# Patient Record
Sex: MALE | Race: WHITE | URBAN - METROPOLITAN AREA
[De-identification: names, ages, dates, MRNs, and addresses within clinical notes are randomized per-mention and may not be internally consistent; named-entity substitution may affect disease eponyms.]

---

## 2017-08-28 ENCOUNTER — HISTORICAL (OUTPATIENT)
Dept: LAB | Facility: HOSPITAL | Age: 82
End: 2017-08-28

## 2017-08-28 LAB
ABS NEUT (OLG): 3.26 X10(3)/MCL (ref 2.1–9.2)
ALBUMIN SERPL-MCNC: 3.9 GM/DL (ref 3.4–5)
ALBUMIN/GLOB SERPL: 1.2 {RATIO}
ALP SERPL-CCNC: 82 UNIT/L (ref 50–136)
ALT SERPL-CCNC: 19 UNIT/L (ref 12–78)
AST SERPL-CCNC: 14 UNIT/L (ref 15–37)
BASOPHILS # BLD AUTO: 0 X10(3)/MCL (ref 0–0.2)
BASOPHILS NFR BLD AUTO: 0 %
BILIRUB SERPL-MCNC: 0.7 MG/DL (ref 0.2–1)
BILIRUBIN DIRECT+TOT PNL SERPL-MCNC: 0.2 MG/DL (ref 0–0.2)
BILIRUBIN DIRECT+TOT PNL SERPL-MCNC: 0.5 MG/DL (ref 0–0.8)
BUN SERPL-MCNC: 16 MG/DL (ref 7–18)
CALCIUM SERPL-MCNC: 8.8 MG/DL (ref 8.5–10.1)
CHLORIDE SERPL-SCNC: 104 MMOL/L (ref 98–107)
CHOLEST SERPL-MCNC: 189 MG/DL (ref 0–200)
CHOLEST/HDLC SERPL: 3.9 {RATIO} (ref 0–5)
CO2 SERPL-SCNC: 31 MMOL/L (ref 21–32)
CREAT SERPL-MCNC: 1.09 MG/DL (ref 0.7–1.3)
EOSINOPHIL # BLD AUTO: 0.2 X10(3)/MCL (ref 0–0.9)
EOSINOPHIL NFR BLD AUTO: 4 %
ERYTHROCYTE [DISTWIDTH] IN BLOOD BY AUTOMATED COUNT: 13 % (ref 11.5–17)
GLOBULIN SER-MCNC: 3.2 GM/DL (ref 2.4–3.5)
GLUCOSE SERPL-MCNC: 80 MG/DL (ref 74–106)
HCT VFR BLD AUTO: 46.2 % (ref 42–52)
HDLC SERPL-MCNC: 49 MG/DL (ref 35–60)
HGB BLD-MCNC: 15.3 GM/DL (ref 14–18)
LDLC SERPL CALC-MCNC: 118 MG/DL (ref 0–129)
LYMPHOCYTES # BLD AUTO: 1.8 X10(3)/MCL (ref 0.6–4.6)
LYMPHOCYTES NFR BLD AUTO: 30 %
MCH RBC QN AUTO: 29.3 PG (ref 27–31)
MCHC RBC AUTO-ENTMCNC: 33.1 GM/DL (ref 33–36)
MCV RBC AUTO: 88.3 FL (ref 80–94)
MONOCYTES # BLD AUTO: 0.6 X10(3)/MCL (ref 0.1–1.3)
MONOCYTES NFR BLD AUTO: 10 %
NEUTROPHILS # BLD AUTO: 3.26 X10(3)/MCL (ref 2.1–9.2)
NEUTROPHILS NFR BLD AUTO: 55 %
PLATELET # BLD AUTO: 185 X10(3)/MCL (ref 130–400)
PMV BLD AUTO: 10.8 FL (ref 9.4–12.4)
POTASSIUM SERPL-SCNC: 3.7 MMOL/L (ref 3.5–5.1)
PROT SERPL-MCNC: 7.1 GM/DL (ref 6.4–8.2)
RBC # BLD AUTO: 5.23 X10(6)/MCL (ref 4.7–6.1)
SODIUM SERPL-SCNC: 142 MMOL/L (ref 136–145)
TRIGL SERPL-MCNC: 109 MG/DL (ref 30–150)
TSH SERPL-ACNC: 3.94 MIU/ML (ref 0.36–3.74)
VLDLC SERPL CALC-MCNC: 22 MG/DL
WBC # SPEC AUTO: 5.9 X10(3)/MCL (ref 4.5–11.5)

## 2018-02-27 ENCOUNTER — HISTORICAL (OUTPATIENT)
Dept: LAB | Facility: HOSPITAL | Age: 83
End: 2018-02-27

## 2018-02-27 LAB
APTT PPP: 23.2 SECOND(S) (ref 21–30)
INR PPP: 1.1 (ref 2–3)
PROTHROMBIN TIME: 10.8 SECOND(S) (ref 9.3–11.4)

## 2018-08-07 ENCOUNTER — HISTORICAL (OUTPATIENT)
Dept: ADMINISTRATIVE | Facility: HOSPITAL | Age: 83
End: 2018-08-07

## 2018-08-21 ENCOUNTER — HISTORICAL (OUTPATIENT)
Dept: LAB | Facility: HOSPITAL | Age: 83
End: 2018-08-21

## 2018-08-21 LAB
T3FREE SERPL-MCNC: 2.53 PG/ML (ref 2.18–3.98)
T4 FREE SERPL-MCNC: 1.29 NG/DL (ref 0.76–1.46)
TSH SERPL-ACNC: 1.51 MIU/L (ref 0.36–3.74)

## 2018-09-05 ENCOUNTER — HISTORICAL (OUTPATIENT)
Dept: ADMINISTRATIVE | Facility: HOSPITAL | Age: 83
End: 2018-09-05

## 2018-09-25 ENCOUNTER — HISTORICAL (OUTPATIENT)
Dept: LAB | Facility: HOSPITAL | Age: 83
End: 2018-09-25

## 2018-09-25 LAB
BUN SERPL-MCNC: 18 MG/DL (ref 7–18)
CALCIUM SERPL-MCNC: 9.3 MG/DL (ref 8.5–10.1)
CHLORIDE SERPL-SCNC: 103 MMOL/L (ref 98–107)
CHOLEST SERPL-MCNC: 160 MG/DL (ref 0–200)
CHOLEST/HDLC SERPL: 3.3 {RATIO} (ref 0–5)
CO2 SERPL-SCNC: 31 MMOL/L (ref 21–32)
CREAT SERPL-MCNC: 1.22 MG/DL (ref 0.7–1.3)
CREAT/UREA NIT SERPL: 14.8
GLUCOSE SERPL-MCNC: 84 MG/DL (ref 74–106)
HDLC SERPL-MCNC: 49 MG/DL (ref 35–60)
LDLC SERPL CALC-MCNC: 87 MG/DL (ref 0–129)
POTASSIUM SERPL-SCNC: 3.8 MMOL/L (ref 3.5–5.1)
SODIUM SERPL-SCNC: 143 MMOL/L (ref 136–145)
TRIGL SERPL-MCNC: 122 MG/DL (ref 30–150)
TSH SERPL-ACNC: 1.53 MIU/L (ref 0.36–3.74)
VLDLC SERPL CALC-MCNC: 24 MG/DL

## 2018-10-17 ENCOUNTER — HISTORICAL (OUTPATIENT)
Dept: ADMINISTRATIVE | Facility: HOSPITAL | Age: 83
End: 2018-10-17

## 2019-03-26 ENCOUNTER — HISTORICAL (OUTPATIENT)
Dept: LAB | Facility: HOSPITAL | Age: 84
End: 2019-03-26

## 2019-03-26 LAB
CHOLEST SERPL-MCNC: 163 MG/DL (ref 0–200)
CHOLEST/HDLC SERPL: 3.3 {RATIO} (ref 0–5)
HDLC SERPL-MCNC: 50 MG/DL (ref 35–60)
LDLC SERPL CALC-MCNC: 94 MG/DL (ref 0–129)
TRIGL SERPL-MCNC: 96 MG/DL (ref 30–150)
TSH SERPL-ACNC: 1.16 MIU/L (ref 0.36–3.74)
VLDLC SERPL CALC-MCNC: 19 MG/DL

## 2019-04-30 ENCOUNTER — HISTORICAL (OUTPATIENT)
Dept: ADMINISTRATIVE | Facility: HOSPITAL | Age: 84
End: 2019-04-30

## 2019-09-26 ENCOUNTER — HISTORICAL (OUTPATIENT)
Dept: LAB | Facility: HOSPITAL | Age: 84
End: 2019-09-26

## 2019-09-26 LAB
ABS NEUT (OLG): 3.82 X10(3)/MCL (ref 2.1–9.2)
ALBUMIN SERPL-MCNC: 3.4 GM/DL (ref 3.4–5)
ALBUMIN/GLOB SERPL: 0.8 RATIO (ref 1–2)
ALP SERPL-CCNC: 96 UNIT/L (ref 45–117)
ALT SERPL-CCNC: 14 UNIT/L (ref 16–61)
APPEARANCE, UA: CLEAR
AST SERPL-CCNC: 15 UNIT/L (ref 15–37)
BASOPHILS # BLD AUTO: 0.03 X10(3)/MCL (ref 0–0.2)
BASOPHILS NFR BLD AUTO: 0.5 % (ref 0–0.9)
BILIRUB SERPL-MCNC: 0.6 MG/DL (ref 0.2–1)
BILIRUB UR QL STRIP: NEGATIVE
BILIRUBIN DIRECT+TOT PNL SERPL-MCNC: 0.14 MG/DL (ref 0–0.2)
BILIRUBIN DIRECT+TOT PNL SERPL-MCNC: 0.46 MG/DL (ref 0–1)
BUN SERPL-MCNC: 14 MG/DL (ref 7–18)
CALCIUM SERPL-MCNC: 8.7 MG/DL (ref 8.5–10.1)
CHLORIDE SERPL-SCNC: 105 MMOL/L (ref 98–107)
CHOLEST SERPL-MCNC: 158 MG/DL (ref 0–199)
CHOLEST/HDLC SERPL: 3 MG/DL (ref 0–8)
CO2 SERPL-SCNC: 32 MMOL/L (ref 21–32)
COLOR UR: YELLOW
CREAT SERPL-MCNC: 1.14 MG/DL (ref 0.7–1.3)
EOSINOPHIL # BLD AUTO: 0.18 X10(3)/MCL (ref 0–0.9)
EOSINOPHIL NFR BLD AUTO: 2.7 % (ref 0–6.5)
ERYTHROCYTE [DISTWIDTH] IN BLOOD BY AUTOMATED COUNT: 13 % (ref 11.5–17)
GLOBULIN SER-MCNC: 4 GM/DL (ref 2–4)
GLUCOSE (UA): NEGATIVE
GLUCOSE SERPL-MCNC: 98 MG/DL (ref 74–106)
HCT VFR BLD AUTO: 44.3 % (ref 42–52)
HDLC SERPL-MCNC: 49 MG/DL
HGB BLD-MCNC: 15.2 GM/DL (ref 14–18)
HGB UR QL STRIP: NEGATIVE
IMM GRANULOCYTES # BLD AUTO: 0.03 10*3/UL (ref 0–0.02)
IMM GRANULOCYTES NFR BLD AUTO: 0.5 % (ref 0–0.43)
KETONES UR QL STRIP: NEGATIVE
LDLC SERPL CALC-MCNC: 94 MG/DL (ref 0–129)
LEUKOCYTE ESTERASE UR QL STRIP: NEGATIVE
LYMPHOCYTES # BLD AUTO: 1.97 X10(3)/MCL (ref 0.6–4.6)
LYMPHOCYTES NFR BLD AUTO: 29.8 % (ref 16.2–38.3)
MCH RBC QN AUTO: 30.6 PG (ref 27–31)
MCHC RBC AUTO-ENTMCNC: 34.3 GM/DL (ref 33–36)
MCV RBC AUTO: 89.1 FL (ref 80–94)
MONOCYTES # BLD AUTO: 0.58 X10(3)/MCL (ref 0.1–1.3)
MONOCYTES NFR BLD AUTO: 8.8 % (ref 4.7–11.3)
NEUTROPHILS # BLD AUTO: 3.82 X10(3)/MCL (ref 2.1–9.2)
NEUTROPHILS NFR BLD AUTO: 57.7 % (ref 49.1–73.4)
NITRITE UR QL STRIP: NEGATIVE
NRBC BLD AUTO-RTO: 0 % (ref 0–0.2)
PH UR STRIP: 6 [PH] (ref 5–7)
PLATELET # BLD AUTO: 201 X10(3)/MCL (ref 130–400)
PMV BLD AUTO: 9.9 FL (ref 7.4–10.4)
POTASSIUM SERPL-SCNC: 3.3 MMOL/L (ref 3.5–5.1)
PROT SERPL-MCNC: 7 GM/DL (ref 6.4–8.2)
PROT UR QL STRIP: NEGATIVE
RBC # BLD AUTO: 4.97 X10(6)/MCL (ref 4.7–6.1)
SODIUM SERPL-SCNC: 142 MMOL/L (ref 136–145)
SP GR UR STRIP: 1.01 (ref 1–1.03)
T4 FREE SERPL-MCNC: 1.13 NG/DL (ref 0.76–1.46)
TRIGL SERPL-MCNC: 76 MG/DL (ref 0–149)
TSH SERPL-ACNC: 2.36 MIU/ML (ref 0.36–3.74)
UROBILINOGEN UR STRIP-ACNC: NEGATIVE
VLDLC SERPL CALC-MCNC: 15 MG/DL
WBC # SPEC AUTO: 6.6 X10(3)/MCL (ref 4.5–11.5)

## 2019-11-22 ENCOUNTER — HISTORICAL (OUTPATIENT)
Dept: ADMINISTRATIVE | Facility: HOSPITAL | Age: 84
End: 2019-11-22

## 2022-04-12 ENCOUNTER — HISTORICAL (OUTPATIENT)
Dept: ADMINISTRATIVE | Facility: HOSPITAL | Age: 87
End: 2022-04-12

## 2022-04-30 VITALS
BODY MASS INDEX: 31.75 KG/M2 | HEIGHT: 70 IN | OXYGEN SATURATION: 98 % | DIASTOLIC BLOOD PRESSURE: 74 MMHG | SYSTOLIC BLOOD PRESSURE: 137 MMHG | WEIGHT: 221.81 LBS

## 2022-05-05 NOTE — HISTORICAL [facility] CERNER
This is a historical note converted from Darlene. Formatting and pictures may have been removed.  Please reference Darlene for original formatting and attached multimedia. Chief Complaint  F/U right leg fx  History of Present Illness  This 86-year-old gentleman returns for his right fibula fracture.? He is developing pain along his syndesmosis.? He is 2 weeks out from injury.? He is accompanied by his wife.? He is ambulating in a cam walker with a single cane.  Review of Systems  Constitutional: No fever, weakness, or fatigue.  Ear/Nose/Mouth/Throat: No nasal congestion or sore throat.  Respiratory: No shortness of breath or cough.  Cardiovascular: No chest pain, palpitations, or peripheral edema.  Gastrointestinal: No nausea, vomiting, or abdominal pain.  Genitourinary: No dysuria.  Musculoskeletal: See current complaints  Integumentary: Negative.  Physical Exam  Vitals & Measurements  HR:?61(Peripheral)? BP:?153/84?  HT:?180?cm? HT:?180?cm? WT:?102.5?kg? WT:?102.5?kg? BMI:?31.64?  Physical examination shows that he is tender on his proximal fibula.? He is tender along his syndesmosis and has pain with compression of the syndesmosis.? He has resolving ecchymosis on the medial side of his ankle.? He appears to be motor and sensory intact.  ?  AP and lateral of the right tibia?to include the entire length of the fibula shows that the patients fracture is unchanged in position but little?callus is seen.? There is minimal widening of the syndesmosis.  Assessment/Plan  1.?Closed fracture of shaft of right fibula  ? The findings were reviewed with the patient and he expressed understanding.? The patient understands that he may?develop progressive syndesmosis instability.? He is comfortable with this and does not want to have surgery.? I will see him back in 4 weeks with new x-rays.? He is instructed to call if he has any problems prior to his next appointment.  Ordered:  Clinic Follow up, *Est. 09/04/18 3:00:00 CDT, Order  for future visit, Closed fracture of shaft of right fibula  Ankle syndesmosis disruption, [facility] [place]  Office/Outpatient Visit Level 3 Established [id number] PC, Closed fracture of shaft of right fibula  Ankle syndesmosis disruption, [facility] [place], 08/07/18 15:15:00 CDT  XR Tibia-Fibula Right 2 Views, Routine, *Est. 09/04/18 3:00:00 CDT, Fracture, None, Ambulatory, Rad Type, Order for future visit, Closed fracture of shaft of right fibula  Ankle syndesmosis disruption, Not Scheduled, *Est. 09/04/18 3:00:00 CDT  ?  2.?Ankle syndesmosis disruption  Ordered:  Clinic Follow up, *Est. 09/04/18 3:00:00 CDT, Order for future visit, Closed fracture of shaft of right fibula  Ankle syndesmosis disruption, [facility] [place]  Office/Outpatient Visit Level 3 Established [id number] PC, Closed fracture of shaft of right fibula  Ankle syndesmosis disruption, [facility] [place], 08/07/18 15:15:00 CDT  XR Tibia-Fibula Right 2 Views, Routine, *Est. 09/04/18 3:00:00 CDT, Fracture, None, Ambulatory, Rad Type, Order for future visit, Closed fracture of shaft of right fibula  Ankle syndesmosis disruption, Not Scheduled, *Est. 09/04/18 3:00:00 CDT  ?   Problem List/Past Medical History  Ongoing  Allergic rhinitis  Ankle syndesmosis disruption  BPH (benign prostatic hyperplasia)  Closed fracture of shaft of right fibula  Glaucoma  History of CVA (cerebrovascular accident)  History of nephrolithiasis  Hypertension  Hypothyroid  Neuropathy (nerve damage)  OA (ocular albinism)  WBC decreased  Historical  HTN (hypertension)  Kidney stones  Stroke  Procedure/Surgical History  Application of short leg splint (calf to foot) (07/20/2018)  Immobilization of Right Lower Leg using Splint (07/20/2018)  Endoscopy and biopsy of upper gastrointestinal tract (02/28/2018)  Repair Left Hand Skin, External Approach (03/14/2016)  Simple repair of superficial wounds of scalp, neck, axillae, external genitalia, trunk and/or extremities (including  hands and feet); 2.6 cm to 7.5 cm (03/14/2016)  Discission of secondary membrane [after cataract] (06/17/2013)  Discission of secondary membranous cataract (opacified posterior lens capsule and/or anterior hyaloid); laser surgery (eg, YAG laser) (1 or more stages). (06/17/2013)  Colonoscopy (2012)  Open leg wound  Pacemaker care assessment   Medications  amlodipine 10 mg oral tablet, 10 mg= 1 tab(s), Oral, Daily, 1 refills  aspirin 81 mg oral tablet, CHEWABLE, 81 mg= 1 tab(s), Oral, Daily  AZOPT AUBRYE 1% OP, 1 drop(s), OPTH, BID  clopidogrel 75 mg oral tablet, 75 mg= 1 tab(s), Oral, Daily, 1 refills  hydrochlorothiazide 25 mg oral tablet, 25 mg= 1 tab(s), Oral, Daily  lansoprazole 30 mg oral DR capsule (pt. 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 ??OverDue  ??? ? ? ?Coronary Artery Disease Maintenance-Antiplatelet Agent Prescribed due??and every?  ??? ??Due?  ??? ? ? ?Coronary Artery Disease Maintenance-Lipid Lowering Therapy due??08/07/18??and every?  ??? ? ?  ?Functional Assessment due??08/07/18??and every 1??year(s)  ??? ? ? ?Pneumococcal Vaccine due??08/07/18??and every 100??year(s)  ??? ? ? ?Pneumococcal Vaccine due??08/07/18??and every?  ??? ??Due In Future?  ??? ? ? ?Hypertension Management-BMP not due until??08/28/18??and every 1??year(s)  ??? ? ? ?Hypertension Management-Blood Pressure not due until??07/24/19??and every 1??year(s)  ???Satisfied?(in the past 1 year)  ??? ??Satisfied?  ??? ? ? ?Blood Pressure Screening on??08/07/18.??Satisfied by Mónica Villaseñor.  ??? ? ? ?Body Mass Index Check on??08/07/18.??Satisfied by Mónica Villaseñor  ??? ? ? ?Coronary Artery Disease Maintenance-Antiplatelet Agent Prescribed on??03/22/18.??Satisfied by Cookie Perez MD  ??? ? ? ?Depression Screening on??08/07/18.??Satisfied by Mónica Villaseñor  ??? ? ? ?Diabetes Screening on??08/28/17.??Satisfied by Cookie Perez MD  ??? ? ? ?Fall Risk Assessment on??08/07/18.??Satisfied by Mónica Villaseñor  ??? ? ? ?Hypertension Management-Blood Pressure on??08/07/18.??Satisfied by Mónica Villaseñor  ??? ? ? ?Influenza Vaccine on??10/23/17.??Satisfied by Zoey Carrizales LPN  ??? ? ? ?Lipid Screening on??08/28/17.??Satisfied by Cookie Perez MD  ??? ? ? ?Obesity Screening on??08/07/18.??Satisfied by Mónica Villaseñor  ?  ?

## 2022-05-05 NOTE — HISTORICAL [facility] CERNER
This is a historical note converted from Darlene. Formatting and pictures may have been removed.  Please reference Darlene for original formatting and attached multimedia. Chief Complaint  4 week F/U Fx right fibula, sydesmosis disruption-patient states he has no pain today-he is ambulating in his cam walker and using a cane  History of Present Illness  This 86-year-old gentleman returns for his right fibula fracture and syndesmosis disruption. ?He is accompanied by his wife.? He has been ambulating in his cam walker. ?He states that as of about a week ago he had no pain.  Review of Systems  Constitutional: No fever, weakness, or fatigue.  Ear/Nose/Mouth/Throat: No nasal congestion or sore throat.  Respiratory: No shortness of breath or cough.  Cardiovascular: No chest pain, palpitations, or peripheral edema.  Gastrointestinal: No nausea, vomiting, or abdominal pain.  Genitourinary: No dysuria.  Musculoskeletal: See current complaints  Integumentary: Negative.  Physical Exam  Vitals & Measurements  HR:?61(Peripheral)? BP:?153/84?  HT:?180?cm? HT:?180?cm? WT:?102.5?kg? WT:?102.5?kg? BMI:?31.64?  Physical examination shows that his alignment is maintained.? There is minimal tenderness at his fracture site.? There is minimal tenderness in his syndesmosis.? He appears to be motor and sensory intact.  ?  AP and lateral?of the right?proximal tibia and fibula shows that his fracture is healing.? There is some displacement but he has excellent bridging callus and should go on to?eventual healing.  Assessment/Plan  1.?Ankle syndesmosis disruption  ? The findings and x-rays were reviewed with the patient and his wife.? He would like to transition to regular shoes as he is comfortable.? I will see him back in 6 weeks with new x-rays. ?He understands that it will take a while for x-ray consolidation.? He is instructed to call if he has any problems prior to his next appointment.? He requested no pain medication.  Ordered:  Clinic  Follow up, *Est. 10/17/18 3:00:00 CDT, Order for future visit, Ankle syndesmosis disruption  Closed fracture of shaft of right fibula, [facility] [place]  Office/Outpatient Visit Level 3 Established [id number] PC, Ankle syndesmosis disruption  Closed fracture of shaft of right fibula, [facility] [place], 09/05/18 14:36:00 CDT  XR Tibia-Fibula Right 2 Views, Routine, *Est. 10/17/18 3:00:00 CDT, Fracture, None, Ambulatory, Rad Type, Order for future visit, Ankle syndesmosis disruption  Closed fracture of shaft of right fibula, Not Scheduled, *Est. 10/17/18 3:00:00 CDT  ?  2.?Closed fracture of shaft of right fibula  Ordered:  Clinic Follow up, *Est. 10/17/18 3:00:00 CDT, Order for future visit, Ankle syndesmosis disruption  Closed fracture of shaft of right fibula, [facility]  Office/Outpatient Visit Level 3 Established [id number] PC, Ankle syndesmosis disruption  Closed fracture of shaft of right fibula, [facility] [place], 09/05/18 14:36:00 CDT  XR Tibia-Fibula Right 2 Views, Routine, *Est. 10/17/18 3:00:00 CDT, Fracture, None, Ambulatory, Rad Type, Order for future visit, Ankle syndesmosis disruption  Closed fracture of shaft of right fibula, Not Scheduled, *Est. 10/17/18 3:00:00 CDT  ?   Problem List/Past Medical History  Ongoing  Allergic rhinitis  Ankle syndesmosis disruption  BPH (benign prostatic hyperplasia)  Closed fracture of shaft of right fibula  Glaucoma  History of CVA (cerebrovascular accident)  History of nephrolithiasis  Hypertension  Hypothyroid  Neuropathy (nerve damage)  OA (ocular albinism)  WBC decreased  Historical  HTN (hypertension)  Kidney stones  Stroke  Procedure/Surgical History  Application of short leg splint (calf to foot) (07/20/2018)  Immobilization of Right Lower Leg using Splint (07/20/2018)  Endoscopy and biopsy of upper gastrointestinal tract (02/28/2018)  Repair Left Hand Skin, External Approach (03/14/2016)  Simple repair of superficial wounds of scalp, neck, axillae,  external genitalia, trunk and/or extremities (including hands and feet); 2.6 cm to 7.5 cm (03/14/2016)  Discission of secondary membrane [after cataract] (06/17/2013)  Discission of secondary membranous cataract (opacified posterior lens capsule and/or anterior hyaloid); laser surgery (eg, YAG laser) (1 or more stages). (06/17/2013)  Colonoscopy (2012)  Open leg wound  Pacemaker care assessment   Medications  amlodipine 10 mg oral tablet, 10 mg= 1 tab(s), Oral, Daily, 1 refills  aspirin 81 mg oral tablet, CHEWABLE, 81 mg= 1 tab(s), Oral, Daily  AZOPT AUBREY 1% OP, 1 drop(s), OPTH, BID  clopidogrel 75 mg oral tablet, 75 mg= 1 tab(s), Oral, Daily, 1 refills  hydrochlorothiazide 25 mg oral tablet, 25 mg= 1 tab(s), Oral, Daily  lansoprazole 30 mg oral DR capsule (pt. 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 ??OverDue  ??? ? ? ?Coronary Artery Disease Maintenance-Antiplatelet Agent Prescribed  due??and every?  ??? ? ? ?Hypertension Management-BMP due??08/28/18??and every 1??year(s)  ??? ??Due?  ??? ? ? ?Coronary Artery Disease Maintenance-Lipid Lowering Therapy due??09/05/18??and every?  ??? ? ? ?Functional Assessment due??09/05/18??and every 1??year(s)  ??? ? ? ?Pneumococcal Vaccine due??09/05/18??and every 100??year(s)  ??? ? ? ?Pneumococcal Vaccine due??09/05/18??and every?  ???Satisfied?(in the past 1 year)  ??? ??Satisfied?  ??? ? ? ?Blood Pressure Screening on??09/05/18.??Satisfied by Mónica Villaseñor  ??? ? ? ?Body Mass Index Check on??09/05/18.??Satisfied by Mónica Villaseñor  ??? ? ? ?Coronary Artery Disease Maintenance-Antiplatelet Agent Prescribed on??03/22/18.??Satisfied by Cookie Perez MD  ??? ? ? ?Depression Screening on??09/05/18.??Satisfied by Mónica Villaseñor  ??? ? ? ?Fall Risk Assessment on??09/05/18.??Satisfied by Mónica Villaseñor  ??? ? ? ?Hypertension Management-Blood Pressure on??09/05/18.??Satisfied by Mónica Villaseñor  ??? ? ? ?Influenza Vaccine on??10/23/17.??Satisfied by Zoey Carrizales LPN  ??? ? ? ?Obesity Screening on??09/05/18.??Satisfied by Mónica Villaseñor  ?  ?

## 2022-05-05 NOTE — HISTORICAL [facility] CERNER
This is a historical note converted from Darlene. Formatting and pictures may have been removed.  Please reference Darlene for original formatting and attached multimedia. Chief Complaint  6 week F/U right leg fx-patient is doing well with no pain today  History of Present Illness  This 86-year-old returns for his?right?proximal?fibula fracture.? The patient is complaining of no pain. ?He is accompanied by his wife.? He states that he has occasional pain around his ankle but not at his fracture site.  Review of Systems  Constitutional: No fever, weakness, or fatigue.  Ear/Nose/Mouth/Throat: No nasal congestion or sore throat.  Respiratory: No shortness of breath or cough.  Cardiovascular: No chest pain, palpitations, or peripheral edema.  Gastrointestinal: No nausea, vomiting, or abdominal pain.  Genitourinary: No dysuria.  Musculoskeletal: See current complaints  Integumentary: Negative.  Physical Exam  Vitals & Measurements  HR:?60(Peripheral)? BP:?109/68?  HT:?180?cm? HT:?180?cm? WT:?100.55?kg? WT:?100.55?kg? BMI:?31.03?  Physical examination shows that he still has some pain with decompression to his proximal fibula.? He has mild pain with compression of his syndesmosis joint.? He appears to be motor and sensory intact.? He walks in regular shoes without an antalgic gait.  ?  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 He is?developing early bridging callus.? His fracture site is still visible.  Assessment/Plan  1.?Ankle syndesmosis disruption  ? The findings were reviewed with the patient and he expressed understanding.? He states that he is comfortable enough and would like to come back just as needed.? He is instructed to call if he has any problems.  Ordered:  Office/Outpatient Visit Level 3 Established [id number] , Ankle syndesmosis disruption  Closed fracture of shaft of right fibula, [facility] [place], 10/17/18 14:14:00  CDT  ?  2.?Closed fracture of shaft of right fibula  Ordered:  Office/Outpatient Visit Level 3 Established [id number] PC, Ankle syndesmosis disruption  Closed fracture of shaft of right fibula, [facility] [place], 10/17/18 14:14:00 CDT  ?  Orders:  Clinic Follow-up PRN, 10/17/18 14:14:00 CDT, Future Order, [facility] [place]   Problem List/Past Medical History  Ongoing  Allergic rhinitis  Ankle syndesmosis disruption  BPH (benign prostatic hyperplasia)  Closed fracture of shaft of right fibula  Glaucoma  History of CVA (cerebrovascular accident)  History of nephrolithiasis  Hypertension  Hypothyroid  Neuropathy (nerve damage)  OA (ocular albinism)  WBC decreased  Historical  HTN (hypertension)  Kidney stones  Stroke  Procedure/Surgical History  Application of short leg splint (calf to foot) (07/20/2018)  Immobilization of Right Lower Leg using Splint (07/20/2018)  Endoscopy and biopsy of upper gastrointestinal tract (02/28/2018)  Repair Left Hand Skin, External Approach (03/14/2016)  Simple repair of superficial wounds of scalp, neck, axillae, external genitalia, trunk and/or extremities (including hands and feet); 2.6 cm to 7.5 cm (03/14/2016)  Discission of secondary membrane [after cataract] (06/17/2013)  Discission of secondary membranous cataract (opacified posterior lens capsule and/or anterior hyaloid); laser surgery (eg, YAG laser) (1 or more stages). (06/17/2013)  Colonoscopy (2012)  Open leg wound  Pacemaker care assessment   Medications  amlodipine 10 mg oral tablet, 10 mg= 1 tab(s), Oral, Daily, 1 refills  aspirin 81 mg oral tablet, CHEWABLE, 81 mg= 1 tab(s), Oral, Daily  AZOPT AUBREY 1% OP, 1 drop(s), OPTH, BID  clopidogrel 75 mg oral tablet, See Instructions, 1 refills  Flonase 50 mcg/inh nasal spray, 2 spray(s), Nasal, BID  hydrochlorothiazide 25 mg oral tablet, 25 mg= 1 tab(s), Oral, Daily  levothyroxine 75 mcg (0.075 mg) oral tablet, See Instructions, 1 refills  Pazeo 0.7% ophthalmic solution, 1 drop(s),  Eye-Both, Daily  Saw Palmetto  Singulair 10 mg oral TABLET, 10 mg= 1 tab(s), Oral, qPM  tamsulosin 0.4 mg oral capsule, 0.4 mg= 1 cap(s), Oral, Daily  Allergies  No Known Allergies  Social History  Alcohol  Current, 1-2 times per year, 12/26/2017  Employment/School  Retired, 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 ??OverDue  ??? ? ? ?Coronary Artery Disease Maintenance-Antiplatelet Agent Prescribed due??and every?  ??? ??Due?  ??? ? ? ?ADL Screening due??10/17/18??and every 1??year(s)  ??? ? ? ?Cognitive Screening due??10/17/18??and every 1??year(s)  ??? ? ? ?Coronary Artery Disease Maintenance-Lipid Lowering Therapy due??10/17/18??and every?  ??? ? ? ?Functional Assessment due??10/17/18??and every 1??year(s)  ??? ? ? ?Geriatric Depression Screening due??10/17/18??and every 1??year(s)  ??? ? ? ?HF-LVEF due??10/17/18??and every?  ??? ? ? ?Lung Cancer Screening due??10/17/18??and every 1??year(s)  ??? ? ? ?Pneumococcal Vaccine due??10/17/18??Variable frequency  ??? ? ? ?Pneumococcal Vaccine due??10/17/18??and every?  ??? ??Due In Future?  ??? ? ? ?HF-Heart Failure Education not due until??07/20/19??and every 1??year(s)  ??? ? ? ?Hypertension Management-BMP not due until??09/25/19??and every 1??year(s)  ??? ? ? ?Advance Directive not due until??09/25/19??and every 1??year(s)  ??? ? ? ?Fall Risk Assessment not due  until??09/25/19??and every 1??year(s)  ???Satisfied?(in the past 1 year)  ??? ??Satisfied?  ??? ? ? ?Advance Directive on??09/25/18.??Satisfied by Ai Cadet LPN  ??? ? ? ?Blood Pressure Screening on??10/17/18.??Satisfied by Mónica Villaseñor  ??? ? ? ?Body Mass Index Check on??10/17/18.??Satisfied by Mónica Villaseñor  ??? ? ? ?Coronary Artery Disease Maintenance-Antiplatelet Agent Prescribed on??10/02/18.??Satisfied by Nicole Fernandez MD  ??? ? ? ?Depression Screening on??09/25/18.??Satisfied by iA Cadet LPN  ??? ? ? ?Diabetes Screening on??09/25/18.??Satisfied by Nicole Fernandez MD  ??? ? ? ?Fall Risk Assessment on??09/25/18.??Satisfied by Ai Cadet LPN  ??? ? ? ?Hypertension Management-Blood Pressure on??10/17/18.??Satisfied by Mónica Villaseñor  ??? ? ? ?Influenza Vaccine on??09/11/18.??Satisfied by Ai Cadet LPN  ??? ? ? ?Lipid Screening on??09/25/18.??Satisfied by Nicole Fernandez MD  ??? ? ? ?Obesity Screening on??10/17/18.??Satisfied by Mónica Villaseñor  ?  ?

## 2022-05-05 NOTE — HISTORICAL [facility] CERNER
This is a historical note converted from [employer]. Formatting and pictures may have been removed.  Please reference Darlene for original formatting and attached multimedia. Chief Complaint  congestion, cough is worse and is better not feeling better from last visit 11/18/19  History of Present Illness  11/18/19 : 87-year-old present to clinic with spouse for?nasal congestion, watery eyes, itchy throat sinus pressure frontal headache since 1 week. ?Gradual in onset and worsening. ?Over-the-counter medications not much help. ?Has tolerated cortisone shot in the past with no shoes. ?Understands the risks and the benefits [1]  Today patient returns to clinic complaining of cough pink, chest congestion,?sounds wet.? Subjective warmth.? Currently on amoxicillin since 3 days.? Appears patient did not get prescription?on same day?as seen last time.? No sore throat no difficulty swallowing  Review of Systems  Constitutional : _No fatigue, No? Fever  HEENT : _No sore throat,?+ sinus congestion  Respiratory : _Coughing, mucus production  Cardiovascular : _No chest pain, no palpitations  Gastrointestinal : _No nausea,?vomiting or diarrhea.? No abdominal pain  Integumentary : _No skin rash or abnormal lesion  Neurologic_no headache, no dizziness  Physical Exam  Vitals & Measurements  T:?37.3? ?C (Oral)? HR:?75(Peripheral)? RR:?17? BP:?137/74? SpO2:?98%?  HT:?178?cm? WT:?100.6?kg? BMI:?31.75?  General : Alert and Oriented, No apparent distress, afebrile,?sounds stuffy congested bronchial coughing  Neck - supple  HENT : Oropharynx?very mild redness and swelling.? TMs intact mild fluid no redness.??  Respiratory :?Bilateral equal breath sounds, nonlabored respirations, decreased breath sound basal lung fields, scattered rhonchi?and expiratory wheeze  Cardiovascular : Rate, rhythm regular, normal volume pulse, no murmur  Integumentary : Warm, Dry and no rash  Assessment/Plan  1.?Asthmatic bronchitis?J45.909  ?Reviewed the chest  x-ray,?haziness right basal lung field. ?Radiology final results will be monitored and reported  Cool mist? vaporizer and cough drops to keep the airways moist. ?Continue amoxicillin for now  Coricidin HBP for cough and cold.? Antihistamine like Claritin or Zyrtec 10 mg for congestion  ER precautions with any acute change in symptoms  Ordered:  benzonatate, 200 mg = 1 cap(s), Oral, TID, PRN PRN as needed for cough, do not crush or chew, X 5 day(s), # 15 cap(s), 0 Refill(s), Pharmacy: [id number]  cefTRIAXone, 1 gm, IM, Once-Unscheduled, first dose 11/22/19 14:44:00 CST  Office/Outpatient Visit Level 3 Established [id number] PC, Asthmatic bronchitis, [facility], 11/22/19 14:44:00 CST  XR Chest 2 Views, Routine, 11/22/19 14:12:00 CST, Cough, None, Ambulatory, Rad Type, Asthmatic bronchitis, Not Scheduled, 11/22/19 14:12:00 CST  ?   Problem List/Past Medical History  Ongoing  Allergic rhinitis  Ankle syndesmosis disruption  Atrial fibrillation  BPH (benign prostatic hyperplasia)  Chronic systolic heart failure  Closed fracture of shaft of right fibula  Contusion of right knee  Coronary artery disease  Glaucoma  History of CVA (cerebrovascular accident)  History of nephrolithiasis  Hypertension  Hypothyroid  Neuropathy (nerve damage)  OA (ocular albinism)  KOKO on CPAP  WBC decreased  Historical  HTN (hypertension)  Kidney stones  Stroke  Procedure/Surgical History  Application of short leg splint (calf to foot) (07/20/2018)  Immobilization of Right Lower Leg using Splint (07/20/2018)  Endoscopy and biopsy of upper gastrointestinal tract (02/28/2018)  Repair Left Hand Skin, External Approach (03/14/2016)  Simple repair of superficial wounds of scalp, neck, axillae, external genitalia, trunk and/or extremities (including hands and feet); 2.6 cm to 7.5 cm (03/14/2016)  Discission of secondary membrane [after cataract] (06/17/2013)  Discission of secondary membranous cataract (opacified posterior lens capsule and/or  anterior hyaloid); laser surgery (eg, YAG laser) (1 or more stages). (06/17/2013)  Colonoscopy (2012)  Open leg wound  Pacemaker care assessment   Medications  amlodipine 10 mg oral tablet, 10 mg= 1 tab(s), Oral, Daily, 1 refills  amoxicillin 875 mg oral tablet, 875 mg= 1 tab(s), Oral, BID  aspirin 81 mg oral tablet, CHEWABLE, 81 mg= 1 tab(s), Oral, Daily, 11 refills  AZOPT AUBREY 1% OP, 1 drop(s), OPTH, BID  cefTRIAXone, 1 gm, IM, Once-Unscheduled  Flonase 50 mcg/inh nasal spray, 2 spray(s), Nasal, BID  hydrochlorothiazide 25 mg oral tablet, See Instructions, 3 refills  levothyroxine 75 mcg (0.075 mg) oral tablet, See Instructions, 1 refills  potassium chloride 10 mEq oral CAPSULE extended release, 10 mEq= 1 cap(s), Oral, Daily, 6 refills  Saw Palmetto  tamsulosin 0.4 mg oral capsule, 0.4 mg= 1 cap(s), Oral, Daily  Tessalon 200 mg oral capsule, 200 mg= 1 cap(s), Oral, TID, PRN  Ventolin HFA 90 mcg/inh inhalation aerosol, 2 puff(s), INH, q6hr, PRN  Allergies  No Known Allergies  Social History  Abuse/Neglect  No, 11/22/2019  Alcohol  Current, 1-2 times per year, 12/26/2017  Employment/School  Retired, 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., 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  Exercise  Home/Environment  Lives with Spouse., 12/03/2015  Nutrition/Health  Regular, 12/09/2015  Sexual  Sexually active: Yes., 12/09/2015  Substance Use  Never, 12/09/2015  Tobacco  Never (less than 100 in lifetime), N/A, 11/22/2019  Immunizations  Vaccine Date Status Comments   pneumococcal 23-polyvalent vaccine 10/28/2019 Recorded [facility]   influenza virus vaccine, inactivated 10/28/2019 Recorded [facility]   influenza virus vaccine, inactivated 09/11/2018 Recorded    influenza virus vaccine, inactivated 10/23/2017 Recorded    influenza virus vaccine, inactivated 09/2016 Recorded    tetanus-diphtheria toxoids 03/14/2016 Given    zoster vaccine live 2014 Recorded    Health Maintenance  Health Maintenance  ???Pending?(in the next year)  ??? ??OverDue  ??? ? ?  ?Coronary Artery Disease Maintenance-Antiplatelet Agent Prescribed due??and every?  ??? ? ? ?Pneumococcal Vaccine due??and every?  ??? ? ? ?Cognitive Screening due??01/01/19??and every 1??year(s)  ??? ? ? ?Functional Assessment due??01/01/19??and every 1??year(s)  ??? ? ? ?Geriatric Depression Screening due??01/01/19??and every 1??year(s)  ??? ? ? ?HF-Heart Failure Education due??07/20/19??and every 1??year(s)  ??? ??Due?  ??? ? ? ?ADL Screening due??11/22/19??and every 1??year(s)  ??? ? ? ?Coronary Artery Disease Maintenance-Lipid Lowering Therapy due??11/22/19??and every?  ??? ? ? ?HF-ACEI/ARB Prescribed if Clinically Indicated due??11/22/19??and every 1??year(s)  ??? ? ? ?HF-Ejection Fraction Past 13 Months if Hospitalized due??11/22/19??and every 1??year(s)  ??? ? ? ?HF-Fluid Restriction/Low Sodium Diet Education due??11/22/19??Variable frequency  ??? ??Due In Future?  ??? ? ? ?Advance Directive not due until??01/01/20??and every 1??year(s)  ??? ? ? ?Fall Risk Assessment not due until??01/01/20??and every 1??year(s)  ??? ? ? ?Obesity Screening not due until??01/01/20??and every 1??year(s)  ??? ? ? ?Hypertension Management-BMP not due until??09/25/20??and every 1??year(s)  ??? ? ? ?Hypertension Management-Blood Pressure not due until??11/21/20??and every 1??year(s)  ???Satisfied?(in the past 1 year)  ??? ??Satisfied?  ??? ? ? ?Advance Directive on??11/18/19.??Satisfied by Louis Ohara  ??? ? ? ?Blood Pressure Screening on??11/22/19.??Satisfied by Louis Ohara  ??? ? ? ?Body Mass Index Check on??11/22/19.??Satisfied by Louis Ohara  ??? ? ? ?Coronary Artery Disease Maintenance-Antiplatelet Agent Prescribed on??09/25/19.??Satisfied by Nicole Fernandez MD  ??? ? ? ?Diabetes Screening on??09/26/19.??Satisfied by Jose Alfredo Hairston  ??? ? ? ?Fall Risk Assessment on??11/22/19.??Satisfied by Louis Ohara  ??? ? ? ?Hypertension Management-Blood Pressure on??11/22/19.??Satisfied by Ashvin  Louis BLISS  ??? ? ? ?Influenza Vaccine on??10/28/19.??Satisfied by Ai Cadet LPN  ??? ? ? ?Lipid Screening on??09/26/19.??Satisfied by Jose Alfredo Hairston  ??? ? ? ?Obesity Screening on??11/22/19.??Satisfied by Louis Ohara  ??? ? ? ?Pneumococcal Vaccine on??10/28/19.??Satisfied by Ai Cadet LPN  ?     [1]?Office Visit Note; Pat Bear MD 11/18/2019 15:51 CST

## 2022-05-05 NOTE — HISTORICAL [facility] CERNER
This is a historical note converted from [employer]. Formatting and pictures may have been removed.  Please reference Certori for original formatting and attached multimedia. Chief Complaint  Patient knocked right knee on coffee table a few weeks ago. Knee is in pain and swelling.  History of Present Illness  This 86-year-old comes in for a new problem.? Patient states that he?struck his right knee on the coffee table several?weeks ago. ?His pain has not?decreased?and he was concerned. ?He is accompanied by his wife.? He has not had x-rays prior to this visit.  Review of Systems  Constitutional: No fever, weakness, or fatigue.? Global deconditioning  Ear/Nose/Mouth/Throat: No nasal congestion or sore throat.  Respiratory: No shortness of breath or cough.  Cardiovascular: No chest pain, palpitations, or peripheral edema.  Gastrointestinal: No nausea, vomiting, or abdominal pain.  Genitourinary: No dysuria.  Musculoskeletal: See current complaints  Integumentary: Negative.  Physical Exam  Vitals & Measurements  HR:?60(Peripheral)? BP:?122/71?  HT:?180?cm? WT:?97.52?kg? BMI:?30.1?  Physical examination shows that the patient walks with an antalgic gait.? He uses a straight cane. ?Examination shows that he is tender on his medial femoral condyle. ?He has no medial collateral ligament laxity.? He has some anterior cruciate laxity.? He has trace posterior lateral corner laxity.? Range of motion is 0-105 degrees with minimal pain.? He has no crepitance. ?He has no evidence of meniscal pathology.? He is motor and sensory intact.  ?  AP,?lateral,?and sunrise views of the right knee standing shows that the patient has minimal arthritic changes.? There is no evidence of acute or chronic bony pathology.  Assessment/Plan  1.?Contusion of right knee?S80.01XA  ? The findings were reviewed with the patient and he expressed understanding.? I explained to the patient that a deep bone contusion can take several months to heal.? I do not  see anything that would require further work-up or certainly no surgery at this time.? The patient will be seen back as needed. ?He is instructed to call me if his symptoms do not giovanny.  Ordered:  Office/Outpatient Visit Level 3 Established [id number] PC, Contusion of right knee  Right knee pain, [facility], 04/30/19 14:59:00 CDT  ?  2.?Right knee pain?M25.561  Ordered:  Office/Outpatient Visit Level 3 Established [id number] PC, Contusion of right knee  Right knee pain, [facility], 04/30/19 14:59:00 CDT  ?  Orders:  Clinic Follow-up PRN, 04/30/19 14:59:00 CDT, Future Order, Orthopaedics  Referrals  Clinic Follow-up PRN, 04/30/19 14:59:00 CDT, [facility]   Problem List/Past Medical History  Ongoing  Allergic rhinitis  Ankle syndesmosis disruption  Atrial fibrillation  BPH (benign prostatic hyperplasia)  Chronic systolic heart failure  Closed fracture of shaft of right fibula  Contusion of right knee  Coronary artery disease  Glaucoma  History of CVA (cerebrovascular accident)  History of nephrolithiasis  Hypertension  Hypothyroid  Neuropathy (nerve damage)  OA (ocular albinism)  KOKO on CPAP  WBC decreased  Historical  HTN (hypertension)  Kidney stones  Stroke  Procedure/Surgical History  Application of short leg splint (calf to foot) (07/20/2018)  Immobilization of Right Lower Leg using Splint (07/20/2018)  Endoscopy and biopsy of upper gastrointestinal tract (02/28/2018)  Repair Left Hand Skin, External Approach (03/14/2016)  Simple repair of superficial wounds of scalp, neck, axillae, external genitalia, trunk and/or extremities (including hands and feet); 2.6 cm to 7.5 cm (03/14/2016)  Discission of secondary membrane [after cataract] (06/17/2013)  Discission of secondary membranous cataract (opacified posterior lens capsule and/or anterior hyaloid); laser surgery (eg, YAG laser) (1 or more stages). (06/17/2013)  Colonoscopy (2012)  Open leg wound  Pacemaker care assessment    Medications  amlodipine 10 mg oral tablet, 10 mg= 1 tab(s), Oral, Daily, 1 refills  aspirin 81 mg oral tablet, CHEWABLE, 81 mg= 1 tab(s), Oral, Daily  AZOPT AUBREY 1% OP, 1 drop(s), OPTH, BID  clopidogrel 75 mg oral tablet, See Instructions, 1 refills  Flonase 50 mcg/inh nasal spray, 2 spray(s), Nasal, BID  hydrochlorothiazide 25 mg oral tablet, See Instructions, 3 refills  levothyroxine 75 mcg (0.075 mg) oral tablet, See Instructions, 1 refills  Saw Palmetto  Singulair 10 mg oral TABLET, 10 mg= 1 tab(s), Oral, qPM  tamsulosin 0.4 mg oral capsule, 0.4 mg= 1 cap(s), Oral, Daily  Allergies  No Known Allergies  Social History  Alcohol  Current, 1-2 times per year, 12/26/2017  Employment/School  Retired, 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., 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  Exercise  Home/Environment  Lives with Spouse., 12/03/2015  Nutrition/Health  Regular, 12/09/2015  Sexual  Sexually active: Yes., 12/09/2015  Substance Abuse  Never, 12/09/2015  Tobacco  Never (less than 100 in lifetime), No, 04/30/2019  Never (less than 100 in lifetime), No, Smokeless Tobacco Use: Never., 03/26/2019  Never smoker, 12/03/2015  Immunizations  Vaccine Date Status   influenza virus vaccine, inactivated 09/11/2018 Recorded   influenza virus vaccine, inactivated 10/23/2017 Recorded   influenza virus vaccine, inactivated 09/2016 Recorded   tetanus-diphtheria toxoids 03/14/2016 Given   zoster vaccine live 2014 Recorded   Health Maintenance  Health Maintenance  ???Pending?(in the next year)  ??? ??OverDue  ??? ? ? ?Coronary Artery Disease Maintenance-Antiplatelet Agent Prescribed due??and every?  ??? ? ? ?Smoking Cessation due??05/16/17??and every 1??year(s)  ??? ??Due?  ??? ? ? ?ADL Screening due??04/30/19??and every 1??year(s)  ??? ? ? ?Cognitive Screening due??04/30/19??and every 1??year(s)  ??? ? ? ?Coronary Artery Disease Maintenance-Lipid Lowering Therapy due??04/30/19??and every?  ??? ? ? ?Functional Assessment due??04/30/19??and every  1??year(s)  ??? ? ? ?Geriatric Depression Screening due??04/30/19??and every 1??year(s)  ??? ? ? ?HF-ACEI/ARB Prescribed if Clinically Indicated due??04/30/19??and every 1??year(s)  ??? ? ? ?HF-Ejection Fraction Past 13 Months if Hospitalized due??04/30/19??and every 1??year(s)  ??? ? ? ?HF-Fluid Restriction/Low Sodium Diet Education due??04/30/19??Variable frequency  ??? ? ? ?HF-LVEF due??04/30/19??and every?  ??? ? ? ?Lung Cancer Screening due??04/30/19??and every 1??year(s)  ??? ? ? ?Pneumococcal Vaccine due??04/30/19??Variable frequency  ??? ? ? ?Pneumococcal Vaccine due??04/30/19??and every?  ??? ??Due In Future?  ??? ? ? ?HF-Heart Failure Education not due until??07/20/19??and every 1??year(s)  ??? ? ? ?Hypertension Management-BMP not due until??09/25/19??and every 1??year(s)  ??? ? ? ?Advance Directive not due until??09/25/19??and every 1??year(s)  ??? ? ? ?Fall Risk Assessment not due until??09/25/19??and every 1??year(s)  ??? ? ? ?Hypertension Management-Blood Pressure not due until??03/25/20??and every 1??year(s)  ???Satisfied?(in the past 1 year)  ??? ??Satisfied?  ??? ? ? ?Advance Directive on??09/25/18.??Satisfied by Ai Cadet LPN  ??? ? ? ?Blood Pressure Screening on??04/30/19.??Satisfied by Yaneth Correa  ??? ? ? ?Body Mass Index Check on??04/30/19.??Satisfied by Yaneth Correa  ??? ? ? ?Coronary Artery Disease Maintenance-Antiplatelet Agent Prescribed on??04/29/19.??Satisfied by Nicole Fernandez MD  ??? ? ? ?Depression Screening on??09/25/18.??Satisfied by Ai Cadet LPN  ??? ? ? ?Diabetes Screening on??09/25/18.??Satisfied by María Bonds MT.  ??? ? ? ?Fall Risk Assessment on??09/25/18.??Satisfied by Ai Cadet LPN  ??? ? ? ?Hypertension Management-Blood Pressure on??04/30/19.??Satisfied by Yaneth Correa  ??? ? ? ?Influenza Vaccine on??09/11/18.??Satisfied by Ai Cadet LPN  ??? ? ? ?Lipid Screening on??03/26/19.??Satisfied by  Chandni Tompkins  ??? ? ? ?Obesity Screening on??04/30/19.??Satisfied by Yaneth Correa  ?  ?